# Patient Record
Sex: FEMALE | Race: WHITE | NOT HISPANIC OR LATINO | Employment: FULL TIME | URBAN - NONMETROPOLITAN AREA
[De-identification: names, ages, dates, MRNs, and addresses within clinical notes are randomized per-mention and may not be internally consistent; named-entity substitution may affect disease eponyms.]

---

## 2018-03-28 ENCOUNTER — OFFICE VISIT (OUTPATIENT)
Dept: URGENT CARE | Facility: PHYSICIAN GROUP | Age: 33
End: 2018-03-28
Payer: MEDICAID

## 2018-03-28 VITALS
HEART RATE: 80 BPM | HEIGHT: 64 IN | BODY MASS INDEX: 25.61 KG/M2 | SYSTOLIC BLOOD PRESSURE: 114 MMHG | WEIGHT: 150 LBS | TEMPERATURE: 97.8 F | RESPIRATION RATE: 16 BRPM | OXYGEN SATURATION: 97 % | DIASTOLIC BLOOD PRESSURE: 82 MMHG

## 2018-03-28 DIAGNOSIS — J31.0 OTHER RHINITIS, UNSPECIFIED CHRONICITY: ICD-10-CM

## 2018-03-28 DIAGNOSIS — H92.02 LEFT EAR PAIN: ICD-10-CM

## 2018-03-28 PROCEDURE — 99203 OFFICE O/P NEW LOW 30 MIN: CPT | Performed by: PHYSICIAN ASSISTANT

## 2018-03-28 RX ORDER — FLUTICASONE PROPIONATE 50 MCG
1 SPRAY, SUSPENSION (ML) NASAL 2 TIMES DAILY
Qty: 1 BOTTLE | Refills: 0 | Status: SHIPPED | OUTPATIENT
Start: 2018-03-28 | End: 2018-09-16 | Stop reason: SDUPTHER

## 2018-03-28 NOTE — PROGRESS NOTES
Chief Complaint   Patient presents with   • Otalgia       HISTORY OF PRESENT ILLNESS: Patient is a 32 y.o. female who presents today for the following:    Left ear pain x yesterday  +chronic nasal congestion  Denies fever, cough, ST, ear drainage, hearing difficulty  OTC meds tried: none     Patient Active Problem List    Diagnosis Date Noted   • Encounter for supervision of other normal pregnancy 2010   • Rh Negative Status During Pregnancy requiring RHO-sami at 28 weeks 2010   • History of  wants  2010       Allergies:Percocet [oxycodone-acetaminophen]    Current Outpatient Prescriptions Ordered in [facility]   Medication Sig Dispense Refill   • fluticasone (FLONASE) 50 MCG/ACT nasal spray Spray 1 Spray in nose 2 times a day. 1 Bottle 0   • acyclovir (ZOVIRAX) 400 MG tablet Take 1 Tab by mouth 2 times a day. 60 Tab 1   • Prenatal Vit w/ Fe Bisg-FA 25-1 MG TABS Take 1 Tab by mouth every day.     • Iron 15 MG TABS Take 1 Tab by mouth every day.       No current [facility]-ordered facility-administered medications on file.        Past Medical History:   Diagnosis Date   • 599.0     W#/ Pg   • Rh Negative Status During Pregnancy requiring RHO-sami at 28 weeks 3/26/2010       Social History   Substance Use Topics   • Smoking status: Current Every Day Smoker     Packs/day: 0.50     Years: 4.00     Types: Cigarettes   • Smokeless tobacco: Never Used      Comment: 3-4 PER DAY no desire to quit   • Alcohol use No       Family Status   Relation Status   • Maternal Aunt Alive   • Paternal Grandmother Alive   • Paternal Grandfather Alive   • Mother Alive   • Father Alive   • Brother Alive   • Maternal Grandmother     alZheimers   • Maternal Grandfather Alive   • Brother Alive     Family History   Problem Relation Age of Onset   • Cancer Maternal Aunt      BREAST   • Heart Disease Paternal Grandmother    • Diabetes Paternal Grandmother      insulin   • Cancer Maternal Grandfather      Throat       ROS:  "   Review of Systems   Constitutional: Negative for fever, chills, weight loss and malaise/fatigue.   HENT: Negative for  nosebleeds,  sore throat and neck pain.    Eyes: Negative for blurred vision.   Respiratory: Negative for cough, sputum production, shortness of breath and wheezing.    Cardiovascular: Negative for chest pain, palpitations, orthopnea and leg swelling.   Gastrointestinal: Negative for heartburn, nausea, vomiting and abdominal pain.   Genitourinary: Negative for dysuria, urgency and frequency.       Exam:  Blood pressure 114/82, pulse 80, temperature 36.6 °C (97.8 °F), resp. rate 16, height 1.626 m (5' 4\"), weight 68 kg (150 lb), SpO2 97 %.  General: Well developed, well nourished. No distress.  HEENT: Conjunctiva clear, lids without ptosis, PERRL/EOMI. Ears normal shape and contour, canals are clear bilaterally, tympanic membranes are benign but retracted bilaterally. Nasal mucosa is edematous bilaterally. Oropharynx is without erythema, edema or exudates. Moist mucous membranes.  Pulmonary: Clear to ausculation and percussion.  Normal effort. No rales, ronchi, or wheezing.   Cardiovascular: Regular rate and rhythm without murmur. No edema.   Neurologic: Grossly nonfocal.  Lymph: No cervical lymphadenopathy noted.  Skin: Warm, dry, good turgor. No rashes in visible areas.   Psych: Normal mood. Alert and oriented x3. Judgment and insight is normal.    Assessment/Plan:  Take all medications as instructed. Discussed appropriate over-the-counter symptomatic medication. Follow-up worsening or persistent symptoms.  1. Other rhinitis, unspecified chronicity  fluticasone (FLONASE) 50 MCG/ACT nasal spray   2. Left ear pain         "

## 2018-04-23 ENCOUNTER — OFFICE VISIT (OUTPATIENT)
Dept: URGENT CARE | Facility: PHYSICIAN GROUP | Age: 33
End: 2018-04-23
Payer: MEDICAID

## 2018-04-23 VITALS
TEMPERATURE: 97.9 F | SYSTOLIC BLOOD PRESSURE: 128 MMHG | BODY MASS INDEX: 26.16 KG/M2 | OXYGEN SATURATION: 100 % | WEIGHT: 157 LBS | DIASTOLIC BLOOD PRESSURE: 76 MMHG | HEIGHT: 65 IN | RESPIRATION RATE: 16 BRPM | HEART RATE: 88 BPM

## 2018-04-23 DIAGNOSIS — R10.9 BELLY PAIN: ICD-10-CM

## 2018-04-23 LAB
APPEARANCE UR: NORMAL
BILIRUB UR STRIP-MCNC: NEGATIVE MG/DL
COLOR UR AUTO: YELLOW
GLUCOSE UR STRIP.AUTO-MCNC: NEGATIVE MG/DL
INT CON NEG: NORMAL
INT CON POS: NORMAL
KETONES UR STRIP.AUTO-MCNC: NEGATIVE MG/DL
LEUKOCYTE ESTERASE UR QL STRIP.AUTO: NORMAL
NITRITE UR QL STRIP.AUTO: NEGATIVE
PH UR STRIP.AUTO: 7.5 [PH] (ref 5–8)
POC URINE PREGNANCY TEST: NEGATIVE
PROT UR QL STRIP: NEGATIVE MG/DL
RBC UR QL AUTO: NORMAL
SP GR UR STRIP.AUTO: 1
UROBILINOGEN UR STRIP-MCNC: NEGATIVE MG/DL

## 2018-04-23 PROCEDURE — 81002 URINALYSIS NONAUTO W/O SCOPE: CPT | Performed by: FAMILY MEDICINE

## 2018-04-23 PROCEDURE — 99214 OFFICE O/P EST MOD 30 MIN: CPT | Mod: 25 | Performed by: FAMILY MEDICINE

## 2018-04-23 PROCEDURE — 81025 URINE PREGNANCY TEST: CPT | Performed by: FAMILY MEDICINE

## 2018-04-23 RX ORDER — TRAMADOL HYDROCHLORIDE 50 MG/1
50 TABLET ORAL EVERY 8 HOURS PRN
Qty: 30 TAB | Refills: 0 | Status: SHIPPED | OUTPATIENT
Start: 2018-04-23 | End: 2018-05-03

## 2018-04-23 RX ORDER — SULFAMETHOXAZOLE AND TRIMETHOPRIM 800; 160 MG/1; MG/1
1 TABLET ORAL EVERY 12 HOURS
Qty: 10 TAB | Refills: 0 | Status: SHIPPED | OUTPATIENT
Start: 2018-04-23 | End: 2018-04-28

## 2018-04-23 ASSESSMENT — ENCOUNTER SYMPTOMS
FOCAL WEAKNESS: 0
FEVER: 0
BLOOD IN STOOL: 0
COUGH: 0
VOMITING: 0
SPUTUM PRODUCTION: 0
HEMOPTYSIS: 0
DIZZINESS: 0
ABDOMINAL PAIN: 1
CHILLS: 0
NAUSEA: 0

## 2018-04-23 NOTE — PROGRESS NOTES
"Subjective:      Jasmyne Virk is a 32 y.o. female who presents with Abdominal Pain    Chief Complaint   Patient presents with   • Abdominal Pain        - This is a very pleasant 32 y.o. female with complaints of waking up w/ RUQ pain today. Has Hx gallstones and says they act up time to time, no fever/vomiting. She had cereal for breakfast w/o problems. No bloody stools.           ALLERGIES:  Percocet [oxycodone-acetaminophen]     PMH:  Past Medical History:   Diagnosis Date   • 599.0     W#/ Pg   • Rh Negative Status During Pregnancy requiring RHO-sami at 28 weeks 3/26/2010        MEDS:    Current Outpatient Prescriptions:   •  tramadol (ULTRAM) 50 MG Tab, Take 1 Tab by mouth every 8 hours as needed for up to 10 days., Disp: 30 Tab, Rfl: 0  •  sulfamethoxazole-trimethoprim (BACTRIM DS) 800-160 MG tablet, Take 1 Tab by mouth every 12 hours for 5 days., Disp: 10 Tab, Rfl: 0  •  fluticasone (FLONASE) 50 MCG/ACT nasal spray, Spray 1 Spray in nose 2 times a day., Disp: 1 Bottle, Rfl: 0  •  acyclovir (ZOVIRAX) 400 MG tablet, Take 1 Tab by mouth 2 times a day., Disp: 60 Tab, Rfl: 1  •  Prenatal Vit w/ Fe Bisg-FA 25-1 MG TABS, Take 1 Tab by mouth every day., Disp: , Rfl:   •  Iron 15 MG TABS, Take 1 Tab by mouth every day., Disp: , Rfl:     ** I have documented what I find to be significant in regards to past medical, social, family and surgical history  in my HPI or under PMH/PSH/FH review section, otherwise it is contributory **           HPI    Review of Systems   Constitutional: Negative for chills and fever.   Respiratory: Negative for cough, hemoptysis and sputum production.    Gastrointestinal: Positive for abdominal pain. Negative for blood in stool, nausea and vomiting.   Genitourinary: Negative for dysuria and frequency.   Neurological: Negative for dizziness and focal weakness.          Objective:     /76   Pulse 88   Temp 36.6 °C (97.9 °F)   Resp 16   Ht 1.651 m (5' 5\")   Wt 71.2 kg (157 lb)   SpO2 " 100%   BMI 26.13 kg/m²      Physical Exam   Constitutional: She appears well-developed. No distress.   HENT:   Head: Normocephalic and atraumatic.   Mouth/Throat: Oropharynx is clear and moist.   Eyes: Conjunctivae are normal.   Neck: Neck supple.   Cardiovascular: Regular rhythm.    No murmur heard.  Pulmonary/Chest: Effort normal. No respiratory distress.   Abdominal: Soft. Bowel sounds are normal. She exhibits no distension. There is tenderness ( mild RUQ w/ deep palp). There is no rebound and no guarding.   Neurological: She is alert. She exhibits normal muscle tone.   Skin: Skin is warm and dry.   Psychiatric: She has a normal mood and affect. Judgment normal.   Nursing note and vitals reviewed.              Assessment/Plan:         1. Belly pain  POCT Urinalysis    POCT Pregnancy    tramadol (ULTRAM) 50 MG Tab    sulfamethoxazole-trimethoprim (BACTRIM DS) 800-160 MG tablet         * symptomatic gallstones. CLD today, start meds, if not improving over next 3-4 hrs or getting worse or NVFC go to ER  * UA +, suspect nonspecific, will cover w/ bactrim anyway     Dx & d/c instructions discussed w/ patient and/or family members. Follow up w/ Prvt Dr/GenSurg, sooner if needed,  ER if worse and UC/PCP unavailable.        Possible side effects (i.e. Rash, GI upset/constipation, sedation, elevation of BP or sugars) of any medications given discussed.

## 2018-05-15 ENCOUNTER — OFFICE VISIT (OUTPATIENT)
Dept: URGENT CARE | Facility: PHYSICIAN GROUP | Age: 33
End: 2018-05-15
Payer: MEDICAID

## 2018-05-15 VITALS
BODY MASS INDEX: 26.66 KG/M2 | HEIGHT: 65 IN | RESPIRATION RATE: 16 BRPM | DIASTOLIC BLOOD PRESSURE: 72 MMHG | OXYGEN SATURATION: 98 % | WEIGHT: 160 LBS | HEART RATE: 88 BPM | SYSTOLIC BLOOD PRESSURE: 114 MMHG | TEMPERATURE: 98.1 F

## 2018-05-15 DIAGNOSIS — M79.604 RIGHT LEG PAIN: ICD-10-CM

## 2018-05-15 PROCEDURE — 99214 OFFICE O/P EST MOD 30 MIN: CPT | Performed by: PHYSICIAN ASSISTANT

## 2018-05-15 RX ORDER — GABAPENTIN 100 MG/1
100 CAPSULE ORAL 3 TIMES DAILY PRN
Qty: 30 CAP | Refills: 0 | Status: SHIPPED | OUTPATIENT
Start: 2018-05-15 | End: 2019-11-18

## 2018-05-16 NOTE — PROGRESS NOTES
Chief Complaint   Patient presents with   • Leg Pain       HISTORY OF PRESENT ILLNESS: Patient is a 32 y.o. female who presents today for the following:    Right leg pain x 1 week, constant  Hard time sleeping, leg seems to kick on it's own  Chronic low back pain  Has RLE pain in the past - only intermittently  Denies recent injury  OTC meds tried: none  No exogenous hormones, recent long trips, recent surgery, h/o DVT/PE  + smokes 1/2ppd,      Patient Active Problem List    Diagnosis Date Noted   • Encounter for supervision of other normal pregnancy 2010   • Rh Negative Status During Pregnancy requiring RHO-sami at 28 weeks 2010   • History of  wants  2010       Allergies:Percocet [oxycodone-acetaminophen]    Current Outpatient Prescriptions Ordered in [facility]   Medication Sig Dispense Refill   • gabapentin (NEURONTIN) 100 MG Cap Take 1 Cap by mouth 3 times a day as needed (pain). Start taking at bedtime only; increase as tolerated 30 Cap 0   • fluticasone (FLONASE) 50 MCG/ACT nasal spray Spray 1 Spray in nose 2 times a day. 1 Bottle 0   • acyclovir (ZOVIRAX) 400 MG tablet Take 1 Tab by mouth 2 times a day. 60 Tab 1   • Prenatal Vit w/ Fe Bisg-FA 25-1 MG TABS Take 1 Tab by mouth every day.     • Iron 15 MG TABS Take 1 Tab by mouth every day.       No current [facility]-ordered facility-administered medications on file.        Past Medical History:   Diagnosis Date   • 599.0     W#/ Pg   • Rh Negative Status During Pregnancy requiring RHO-sami at 28 weeks 3/26/2010       Social History   Substance Use Topics   • Smoking status: Current Every Day Smoker     Packs/day: 0.50     Years: 4.00     Types: Cigarettes   • Smokeless tobacco: Never Used      Comment: 3-4 PER DAY no desire to quit   • Alcohol use No       Family Status   Relation Status   • Maternal Aunt Alive   • Paternal Grandmother Alive   • Paternal Grandfather Alive   • Mother Alive   • Father Alive   • Brother Alive   • Maternal  "Grandmother     alZheimers   • Maternal Grandfather Alive   • Brother Alive     Family History   Problem Relation Age of Onset   • Cancer Maternal Aunt      BREAST   • Heart Disease Paternal Grandmother    • Diabetes Paternal Grandmother      insulin   • Cancer Maternal Grandfather      Throat       Review of Systems:    Constitutional ROS: No unexpected change in weight, No weakness, No fatigue  Eye ROS: No recent significant change in vision, No eye pain, redness, discharge  Ear ROS: No drainage, No tinnitus or vertigo, No recent change in hearing  Mouth/Throat ROS: No teeth or gum problems, No bleeding gums, No tongue complaints  Neck ROS: No swollen glands, No significant pain in neck  Pulmonary ROS: No chronic cough, sputum, or hemoptysis, No dyspnea on exertion, No wheezing  Cardiovascular ROS: No diaphoresis, No edema, No palpitations  Gastrointestinal ROS: No change in bowel habits, No significant change in appetite, No nausea, vomiting, diarrhea, or constipation  Hematologic/Lymphatic ROS: No chills, No night sweats, No weight loss  Skin/Integumentary ROS: No edema, No evidence of rash, No itching      Exam:  Blood pressure 114/72, pulse 88, temperature 36.7 °C (98.1 °F), resp. rate 16, height 1.651 m (5' 5\"), weight 72.6 kg (160 lb), SpO2 98 %.  General: Well developed, well nourished. No distress.  HEENT: Head is grossly normal.  Pulmonary: Unlabored respiratory effort.    Cardiovascular: Pedal pulses are strong and equal bilaterally.  Extremities: No motor or sensory deficit noted. No obvious edema noted in the right lower leg. No rashes, bruising, ecchymosis, or petechiae is noted. Mild tenderness is noted on the right calf without associated swelling. Pain is not worse with passive dorsiflexion. Otherwise no localized tenderness is noted on the right lower leg.  Psych: Normal mood. Alert and oriented x3. Judgment and insight is normal.    Assessment/Plan:  Discussed differential diagnosis with " the patient including to muscle strain versus nerve pain versus DVT. The patient understands we are unable to rule out DVT in this location. Vital signs are within normal limits. Patient has no other significant risk factors for DVT at this time. Will start gabapentin but discussed ER precautions for any worsening leg pain or the development of swelling.  1. Right leg pain  gabapentin (NEURONTIN) 100 MG Cap

## 2018-09-16 ENCOUNTER — OFFICE VISIT (OUTPATIENT)
Dept: URGENT CARE | Facility: PHYSICIAN GROUP | Age: 33
End: 2018-09-16
Payer: MEDICAID

## 2018-09-16 VITALS
WEIGHT: 161 LBS | HEART RATE: 68 BPM | OXYGEN SATURATION: 98 % | SYSTOLIC BLOOD PRESSURE: 116 MMHG | HEIGHT: 65 IN | RESPIRATION RATE: 16 BRPM | BODY MASS INDEX: 26.82 KG/M2 | TEMPERATURE: 98.8 F | DIASTOLIC BLOOD PRESSURE: 78 MMHG

## 2018-09-16 DIAGNOSIS — J00 ACUTE RHINITIS: ICD-10-CM

## 2018-09-16 DIAGNOSIS — H65.93 FLUID LEVEL BEHIND TYMPANIC MEMBRANE OF BOTH EARS: ICD-10-CM

## 2018-09-16 PROCEDURE — 99214 OFFICE O/P EST MOD 30 MIN: CPT | Performed by: PHYSICIAN ASSISTANT

## 2018-09-16 RX ORDER — FLUTICASONE PROPIONATE 50 MCG
1 SPRAY, SUSPENSION (ML) NASAL 2 TIMES DAILY
Qty: 1 BOTTLE | Refills: 5 | Status: SHIPPED | OUTPATIENT
Start: 2018-09-16

## 2018-09-16 NOTE — PROGRESS NOTES
Chief Complaint   Patient presents with   • Otalgia       HISTORY OF PRESENT ILLNESS: Patient is a 33 y.o. female who presents today for the following:    Left ear pain x yesterday  Denies drainage; sounds muffled  + mild cough, mild ST  Denies fever, nasal congestion  OTC meds today: none     Patient Active Problem List    Diagnosis Date Noted   • Encounter for supervision of other normal pregnancy 2010   • Rh Negative Status During Pregnancy requiring RHO-sami at 28 weeks 2010   • History of  wants  2010       Allergies:Percocet [oxycodone-acetaminophen]    Current Outpatient Prescriptions Ordered in [facility]   Medication Sig Dispense Refill   • fluticasone (FLONASE) 50 MCG/ACT nasal spray Spray 1 Spray in nose 2 times a day. 1 Bottle 5   • gabapentin (NEURONTIN) 100 MG Cap Take 1 Cap by mouth 3 times a day as needed (pain). Start taking at bedtime only; increase as tolerated 30 Cap 0   • acyclovir (ZOVIRAX) 400 MG tablet Take 1 Tab by mouth 2 times a day. 60 Tab 1   • Prenatal Vit w/ Fe Bisg-FA 25-1 MG TABS Take 1 Tab by mouth every day.     • Iron 15 MG TABS Take 1 Tab by mouth every day.       No current [facility]-ordered facility-administered medications on file.        Past Medical History:   Diagnosis Date   • 599.0     W#/ Pg   • Rh Negative Status During Pregnancy requiring RHO-sami at 28 weeks 3/26/2010       Social History   Substance Use Topics   • Smoking status: Current Every Day Smoker     Packs/day: 0.50     Years: 4.00     Types: Cigarettes   • Smokeless tobacco: Never Used      Comment: 3-4 PER DAY no desire to quit   • Alcohol use No       Family Status   Relation Status   • MAunt Alive   • PGMo Alive   • PGFa Alive   • Mo Alive   • Fa Alive   • Bro Alive   • MGMo         alZheimers   • MGFa Alive   • Bro Alive     Family History   Problem Relation Age of Onset   • Cancer Maternal Aunt         BREAST   • Heart Disease Paternal Grandmother    • Diabetes Paternal  "Grandmother         insulin   • Cancer Maternal Grandfather         Throat       Review of Systems:   Constitutional ROS: No unexpected change in weight, No weakness, No fatigue  Eye ROS: No recent significant change in vision, No eye pain, redness, discharge  Ear ROS: No drainage, No tinnitus or vertigo, No recent change in hearing  Mouth/Throat ROS: No teeth or gum problems, No bleeding gums, No tongue complaints  Neck ROS: No swollen glands, No significant pain in neck  Pulmonary ROS: No chronic cough, sputum, or hemoptysis, No dyspnea on exertion, No wheezing  Cardiovascular ROS: No diaphoresis, No edema, No palpitations  Gastrointestinal ROS: No change in bowel habits, No significant change in appetite, No nausea, vomiting, diarrhea, or constipation  Musculoskeletal/Extremities ROS: No peripheral edema, No pain, redness or swelling on the joints  Hematologic/Lymphatic ROS: No chills, No night sweats, No weight loss  Skin/Integumentary ROS: No edema, No evidence of rash, No itching      Exam:  Blood pressure 116/78, pulse 68, temperature 37.1 °C (98.8 °F), resp. rate 16, height 1.651 m (5' 5\"), weight 73 kg (161 lb), SpO2 98 %.  General: Well developed, well nourished. No distress.  Eye: PERRL/EOMI; conjunctivae clear, lids normal.  ENMT: Lips without lesions, MMM. Oropharynx is clear. Bilateral TMs are within normal limits but with clear fluid posteriorly bilaterally.  Nasal mucosa is edematous bilaterally.  Pulmonary: Unlabored respiratory effort. Lungs clear to auscultation, no wheezes, no rhonchi.  Cardiovascular: Regular rate and rhythm without murmur.    Neurologic: Grossly nonfocal. No facial asymmetry noted.  Lymph: No cervical lymphadenopathy noted.  Skin: Warm, dry, good turgor. No rashes in visible areas.   Psych: Normal mood. Alert and oriented x3. Judgment and insight is normal.    Assessment/Plan:  Discussed likely due to seasonal allergies. Discussed appropriate over-the-counter symptomatic " medication, and when to return to clinic.  Use all medication as directed.  Follow-up for worsening or persistent symptoms.  1. Fluid level behind tympanic membrane of both ears  fluticasone (FLONASE) 50 MCG/ACT nasal spray   2. Acute rhinitis  fluticasone (FLONASE) 50 MCG/ACT nasal spray

## 2018-10-28 ENCOUNTER — OFFICE VISIT (OUTPATIENT)
Dept: URGENT CARE | Facility: PHYSICIAN GROUP | Age: 33
End: 2018-10-28
Payer: MEDICAID

## 2018-10-28 VITALS
WEIGHT: 161 LBS | OXYGEN SATURATION: 95 % | HEIGHT: 65 IN | SYSTOLIC BLOOD PRESSURE: 130 MMHG | BODY MASS INDEX: 26.82 KG/M2 | TEMPERATURE: 97.8 F | DIASTOLIC BLOOD PRESSURE: 80 MMHG | RESPIRATION RATE: 14 BRPM | HEART RATE: 90 BPM

## 2018-10-28 DIAGNOSIS — R22.0 TONGUE SWELLING: ICD-10-CM

## 2018-10-28 PROCEDURE — 99214 OFFICE O/P EST MOD 30 MIN: CPT | Performed by: PHYSICIAN ASSISTANT

## 2018-10-28 RX ORDER — AMOXICILLIN 500 MG/1
500 CAPSULE ORAL 2 TIMES DAILY
Qty: 20 CAP | Refills: 0 | Status: SHIPPED | OUTPATIENT
Start: 2018-10-28 | End: 2018-11-07

## 2018-10-28 NOTE — PROGRESS NOTES
Chief Complaint   Patient presents with   • URI     Cold Sx       HISTORY OF PRESENT ILLNESS: Patient is a 33 y.o. female who presents today for the following:    Tongue swelling/pain x last night  Tender lymph nodes  S/p anna 3 weeks ago; took her tongue ring out that she has had since she was 16 years old.  Put it back in about 10 days ago.  + nasal congestion, cough  Denies fever, SOB  OTC meds: nothing today     Patient Active Problem List    Diagnosis Date Noted   • Encounter for supervision of other normal pregnancy 2010   • Rh Negative Status During Pregnancy requiring RHO-sami at 28 weeks 2010   • History of  wants  2010       Allergies:Percocet [oxycodone-acetaminophen]    Current Outpatient Prescriptions Ordered in [facility]   Medication Sig Dispense Refill   • amoxicillin (AMOXIL) 500 MG Cap Take 1 Cap by mouth 2 times a day for 10 days. 20 Cap 0   • fluticasone (FLONASE) 50 MCG/ACT nasal spray Spray 1 Spray in nose 2 times a day. 1 Bottle 5   • gabapentin (NEURONTIN) 100 MG Cap Take 1 Cap by mouth 3 times a day as needed (pain). Start taking at bedtime only; increase as tolerated 30 Cap 0   • acyclovir (ZOVIRAX) 400 MG tablet Take 1 Tab by mouth 2 times a day. 60 Tab 1   • Prenatal Vit w/ Fe Bisg-FA 25-1 MG TABS Take 1 Tab by mouth every day.     • Iron 15 MG TABS Take 1 Tab by mouth every day.       No current [facility]-ordered facility-administered medications on file.        Past Medical History:   Diagnosis Date   • 599.0     W#/ Pg   • Rh Negative Status During Pregnancy requiring RHO-sami at 28 weeks 3/26/2010       Social History   Substance Use Topics   • Smoking status: Current Every Day Smoker     Packs/day: 0.50     Years: 4.00     Types: Cigarettes   • Smokeless tobacco: Never Used      Comment: 3-4 PER DAY no desire to quit   • Alcohol use No       Family Status   Relation Status   • MAunt Alive   • PGMo Alive   • PGFa Alive   • Mo Alive   • Fa Alive   • Bro Alive  "  • MGMo         alZheimers   • MGFa Alive   • Bro Alive     Family History   Problem Relation Age of Onset   • Cancer Maternal Aunt         BREAST   • Heart Disease Paternal Grandmother    • Diabetes Paternal Grandmother         insulin   • Cancer Maternal Grandfather         Throat       Review of Systems:   Constitutional ROS: No unexpected change in weight, No weakness, No fatigue  Eye ROS: No recent significant change in vision, No eye pain, redness, discharge  Ear ROS: No drainage, No tinnitus or vertigo, No recent change in hearing  Mouth/Throat ROS: No teeth or gum problems, No bleeding gums, No tongue complaints  Neck ROS: No swollen glands, No significant pain in neck  Pulmonary ROS: No chronic cough, sputum, or hemoptysis, No dyspnea on exertion, No wheezing  Cardiovascular ROS: No diaphoresis, No edema, No palpitations  Gastrointestinal ROS: No change in bowel habits, No significant change in appetite, No nausea, vomiting, diarrhea, or constipation  Musculoskeletal/Extremities ROS: No peripheral edema, No pain, redness or swelling on the joints  Hematologic/Lymphatic ROS: No chills, No night sweats, No weight loss  Skin/Integumentary ROS: No edema, No evidence of rash, No itching      Exam:  Blood pressure 130/80, pulse 90, temperature 36.6 °C (97.8 °F), temperature source Temporal, resp. rate 14, height 1.651 m (5' 5\"), weight 73 kg (161 lb), SpO2 95 %.  General: Well developed, well nourished. No distress.  HEENT: Diffusely poor dentition.  No obvious edema noted on the tongue however the tongue is not visible underneath the tongue.  It appears that it has been pushed into the meat of the tongue.  No erythema or drainage is noted of the tongue.  Pulmonary: Unlabored respiratory effort.    Neurologic: Grossly nonfocal. No facial asymmetry noted.  Lymph: Tender anterior cervical lymphadenopathy noted bilaterally.  Skin: Warm, dry, good turgor. No rashes in visible areas.   Psych: Normal mood. " Alert and oriented x3. Judgment and insight is normal.    Assessment/Plan:  Use all medication as directed.  Follow-up for worsening or persistent symptoms.  1. Tongue swelling  amoxicillin (AMOXIL) 500 MG Cap

## 2019-08-27 ENCOUNTER — NON-PROVIDER VISIT (OUTPATIENT)
Dept: URGENT CARE | Facility: PHYSICIAN GROUP | Age: 34
End: 2019-08-27

## 2019-08-27 DIAGNOSIS — Z02.1 PRE-EMPLOYMENT DRUG SCREENING: ICD-10-CM

## 2019-08-27 LAB
AMP AMPHETAMINE: NORMAL
COC COCAINE: NORMAL
INT CON NEG: NORMAL
INT CON POS: NORMAL
MET METHAMPHETAMINES: NORMAL
OPI OPIATES: NORMAL
PCP PHENCYCLIDINE: NORMAL
POC DRUG COMMENT 753798-OCCUPATIONAL HEALTH: NORMAL
THC: NORMAL

## 2019-08-27 PROCEDURE — 80305 DRUG TEST PRSMV DIR OPT OBS: CPT | Performed by: PHYSICIAN ASSISTANT

## 2019-11-18 ENCOUNTER — OFFICE VISIT (OUTPATIENT)
Dept: URGENT CARE | Facility: PHYSICIAN GROUP | Age: 34
End: 2019-11-18
Payer: MEDICAID

## 2019-11-18 VITALS
WEIGHT: 133.8 LBS | OXYGEN SATURATION: 98 % | SYSTOLIC BLOOD PRESSURE: 110 MMHG | HEART RATE: 79 BPM | BODY MASS INDEX: 22.27 KG/M2 | DIASTOLIC BLOOD PRESSURE: 70 MMHG | TEMPERATURE: 98.1 F | RESPIRATION RATE: 16 BRPM

## 2019-11-18 DIAGNOSIS — J06.9 URI WITH COUGH AND CONGESTION: ICD-10-CM

## 2019-11-18 PROCEDURE — 99214 OFFICE O/P EST MOD 30 MIN: CPT | Performed by: PHYSICIAN ASSISTANT

## 2019-11-18 RX ORDER — AZITHROMYCIN 250 MG/1
TABLET, FILM COATED ORAL
Qty: 6 TAB | Refills: 0 | Status: SHIPPED | OUTPATIENT
Start: 2019-11-18 | End: 2019-12-21

## 2019-11-18 NOTE — LETTER
November 18, 2019         Patient: Jasmyne Virk   YOB: 1985   Date of Visit: 11/18/2019           To Whom it May Concern:    Jasmyne Virk was seen in my clinic on 11/18/2019. She may return to work on 11/19/19.    If you have any questions or concerns, please don't hesitate to call.        Sincerely,           Phyllis Camejo P.A.-C.  Electronically Signed

## 2019-11-18 NOTE — PROGRESS NOTES
Chief Complaint   Patient presents with   • Cough     x 4days       HISTORY OF PRESENT ILLNESS: Patient is a 34 y.o. female who presents today for the following:    Patient comes in for evaluation of a cough that started 3 days ago.  She complains of chills and body aches for the last 2 days but has not had anything today.  She denies fever, shortness of breath, and vomiting with coughing.  She has not taken any over-the-counter medication.  She works in [place] is concerned about with a cough.    Patient Active Problem List    Diagnosis Date Noted   • Encounter for supervision of other normal pregnancy 2010   • Rh Negative Status During Pregnancy requiring RHO-sami at 28 weeks 2010   • History of  wants  2010       Allergies:Percocet [oxycodone-acetaminophen]    Current Outpatient Medications Ordered in Epic   Medication Sig Dispense Refill   • azithromycin (ZITHROMAX) 250 MG Tab Use as package directs. Fill if symptoms worsen at any point OR if they fail to improve over the next 7-10 days. 6 Tab 0   • fluticasone (FLONASE) 50 MCG/ACT nasal spray Spray 1 Spray in nose 2 times a day. 1 Bottle 5   • Prenatal Vit w/ Fe Bisg-FA 25-1 MG TABS Take 1 Tab by mouth every day.     • Iron 15 MG TABS Take 1 Tab by mouth every day.       No current [facility]-ordered facility-administered medications on file.        Past Medical History:   Diagnosis Date   • 599.0     W#/ Pg   • Rh Negative Status During Pregnancy requiring RHO-sami at 28 weeks 3/26/2010       Social History     Tobacco Use   • Smoking status: Current Every Day Smoker     Packs/day: 0.50     Years: 4.00     Pack years: 2.00     Types: Cigarettes   • Smokeless tobacco: Never Used   • Tobacco comment: 3-4 PER DAY no desire to quit   Substance Use Topics   • Alcohol use: No   • Drug use: No       Family Status   Relation Name Status   • MAunt  Alive   • PGMo  Alive   • PGFa  Alive   • Mo  Alive   • Fa  Alive   • Bro  Alive   • MGMo           alZheimers   • MGFa  Alive   • Bro  Alive     Family History   Problem Relation Age of Onset   • Cancer Maternal Aunt         BREAST   • Heart Disease Paternal Grandmother    • Diabetes Paternal Grandmother         insulin   • Cancer Maternal Grandfather         Throat       Review of Systems:   Constitutional ROS: No unexpected change in weight, No weakness, No fatigue  Eye ROS: No recent significant change in vision, No eye pain, redness, discharge  Ear ROS: No drainage, No tinnitus or vertigo, No recent change in hearing  Mouth/Throat ROS: No teeth or gum problems, No bleeding gums, No tongue complaints  Neck ROS: No swollen glands, No significant pain in neck  Pulmonary ROS: No chronic cough, sputum, or hemoptysis, No dyspnea on exertion, No wheezing  Cardiovascular ROS: No diaphoresis, No edema, No palpitations  Gastrointestinal ROS: No change in bowel habits, No significant change in appetite, No nausea, vomiting, diarrhea, or constipation  Musculoskeletal/Extremities ROS: No peripheral edema, No pain, redness or swelling on the joints  Hematologic/Lymphatic ROS: Positive for body aches and chills, resolved.  Skin/Integumentary ROS: No edema, No evidence of rash, No itching      Exam:  /70   Pulse 79   Temp 36.7 °C (98.1 °F) (Temporal)   Resp 16   Wt 60.7 kg (133 lb 12.8 oz)   SpO2 98%   General: Well developed, well nourished. No distress.  Eye: PERRL/EOMI; conjunctivae clear, lids normal.  ENMT: Lips without lesions, MMM. Oropharynx is clear. Bilateral TMs are within normal limits.  Pulmonary: Unlabored respiratory effort. Lungs clear to auscultation, no wheezes, no rhonchi.  Cardiovascular: Regular rate and rhythm without murmur.    Neurologic: Grossly nonfocal. No facial asymmetry noted.  Lymph: No cervical lymphadenopathy noted.  Skin: Warm, dry, good turgor. No rashes in visible areas.   Psych: Normal mood. Alert and oriented x3. Judgment and insight is  normal.    Assessment/Plan:  Discussed likely viral etiology, low suspicion for open cough at this time but provided patient with contingent antibiotics should symptoms change.  Follow-up for worsening or persistent symptoms.  1. URI with cough and congestion  azithromycin (ZITHROMAX) 250 MG Tab

## 2019-12-21 ENCOUNTER — OCCUPATIONAL MEDICINE (OUTPATIENT)
Dept: URGENT CARE | Facility: PHYSICIAN GROUP | Age: 34
End: 2019-12-21
Payer: MEDICAID

## 2019-12-21 VITALS
HEART RATE: 96 BPM | RESPIRATION RATE: 16 BRPM | WEIGHT: 133 LBS | HEIGHT: 65 IN | OXYGEN SATURATION: 100 % | BODY MASS INDEX: 22.16 KG/M2 | SYSTOLIC BLOOD PRESSURE: 100 MMHG | TEMPERATURE: 97.8 F | DIASTOLIC BLOOD PRESSURE: 72 MMHG

## 2019-12-21 DIAGNOSIS — S66.911A STRAIN OF RIGHT WRIST, INITIAL ENCOUNTER: ICD-10-CM

## 2019-12-21 DIAGNOSIS — S56.911A STRAIN OF RIGHT ELBOW AND FOREARM, INITIAL ENCOUNTER: ICD-10-CM

## 2019-12-21 DIAGNOSIS — S66.911A STRAIN OF RIGHT HAND, INITIAL ENCOUNTER: ICD-10-CM

## 2019-12-21 LAB
AMP AMPHETAMINE: NORMAL
BAR BARBITURATES: NORMAL
BREATH ALCOHOL COMMENT: NORMAL
BZO BENZODIAZEPINES: NORMAL
COC COCAINE: NORMAL
INT CON NEG: NORMAL
INT CON POS: NORMAL
MDMA ECSTASY: NORMAL
MET METHAMPHETAMINES: NORMAL
MTD METHADONE: NORMAL
OPI OPIATES: NORMAL
OXY OXYCODONE: NORMAL
PCP PHENCYCLIDINE: NORMAL
POC BREATHALIZER: 0 PERCENT (ref 0–0.01)
POC URINE DRUG SCREEN OCDRS: NEGATIVE
THC: NORMAL

## 2019-12-21 PROCEDURE — 80305 DRUG TEST PRSMV DIR OPT OBS: CPT | Performed by: FAMILY MEDICINE

## 2019-12-21 PROCEDURE — 82075 ASSAY OF BREATH ETHANOL: CPT | Performed by: FAMILY MEDICINE

## 2019-12-21 PROCEDURE — 99214 OFFICE O/P EST MOD 30 MIN: CPT | Performed by: FAMILY MEDICINE

## 2019-12-21 RX ORDER — PREDNISONE 20 MG/1
TABLET ORAL
Qty: 15 TAB | Refills: 0 | Status: SHIPPED | OUTPATIENT
Start: 2019-12-21

## 2019-12-21 RX ORDER — KETOROLAC TROMETHAMINE 30 MG/ML
60 INJECTION, SOLUTION INTRAMUSCULAR; INTRAVENOUS ONCE
Status: COMPLETED | OUTPATIENT
Start: 2019-12-21 | End: 2019-12-21

## 2019-12-21 RX ADMIN — KETOROLAC TROMETHAMINE 60 MG: 30 INJECTION, SOLUTION INTRAMUSCULAR; INTRAVENOUS at 14:45

## 2019-12-21 NOTE — PROGRESS NOTES
Chief Complaint:    Chief Complaint   Patient presents with   • Work-Related Injury     New WC for R elbow and R wrist injury/ DOI: 08/15/2019/ Mirta       History of Present Illness:    DOI: 8/15/19. She was putting heavy PTE onto molds which led to pain right elbow, forearm, wrist, and hand. Told work about this and work wrapped it and gave her Tylenol. Have used Advil and Ibuprofen without help. Pain is worse when using right hand, such as picking up coffee cup. She sometimes gets trigger finger right 4th finger.      Review of Systems:    Constitutional: Negative for fever, chills, and diaphoresis.   Eyes: Negative for change in vision, photophobia, pain, redness, and discharge.  ENT: Negative for ear pain, ear discharge, hearing loss, tinnitus, nasal congestion, nosebleeds, and sore throat.    Respiratory: Negative for cough, hemoptysis, sputum production, shortness of breath, wheezing, and stridor.    Cardiovascular: Negative for chest pain, palpitations, orthopnea, claudication, leg swelling, and PND.   Gastrointestinal: Negative for abdominal pain, nausea, vomiting, diarrhea, constipation, blood in stool, and melena.   Genitourinary: Negative for dysuria, urinary urgency, urinary frequency, hematuria, and flank pain.   Musculoskeletal: See HPI.   Skin: Negative for rash and itching.   Neurological: Negative for dizziness, tingling, tremors, sensory change, speech change, focal weakness, seizures, loss of consciousness, and headaches.   Endo: Negative for polydipsia.   Heme: Does not bruise/bleed easily.   Psychiatric/Behavioral: Negative for depression, suicidal ideas, hallucinations, memory loss and substance abuse. The patient is not nervous/anxious and does not have insomnia.        Past Medical History:    Past Medical History:   Diagnosis Date   • 599.0     W#/ Pg   • Rh Negative Status During Pregnancy requiring RHO-sami at 28 weeks 3/26/2010     Past Surgical History:    Past Surgical History:    Procedure Laterality Date   • REPEAT C SECTION  6/26/2010    Performed by MARCO A BAUER at [facility]   • PRIMARY C SECTION  2003     Social History:    Social History     Socioeconomic History   • Marital status: Single     Spouse name: Not on file   • Number of children: Not on file   • Years of education: Not on file   • Highest education level: Not on file   Occupational History   • Not on file   Social Needs   • Financial resource strain: Not on file   • Food insecurity:     Worry: Not on file     Inability: Not on file   • Transportation needs:     Medical: Not on file     Non-medical: Not on file   Tobacco Use   • Smoking status: Current Every Day Smoker     Packs/day: 0.50     Years: 4.00     Pack years: 2.00     Types: Cigarettes   • Smokeless tobacco: Never Used   • Tobacco comment: 3-4 PER DAY no desire to quit   Substance and Sexual Activity   • Alcohol use: No   • Drug use: No   • Sexual activity: Yes     Partners: Male     Birth control/protection: Other-See Comments     Comment: VCF   Lifestyle   • Physical activity:     Days per week: Not on file     Minutes per session: Not on file   • Stress: Not on file   Relationships   • Social connections:     Talks on phone: Not on file     Gets together: Not on file     Attends Temple service: Not on file     Active member of club or organization: Not on file     Attends meetings of clubs or organizations: Not on file     Relationship status: Not on file   • Intimate partner violence:     Fear of current or ex partner: Not on file     Emotionally abused: Not on file     Physically abused: Not on file     Forced sexual activity: Not on file   Other Topics Concern   • Not on file   Social History Narrative   • Not on file     Family History:    Family History   Problem Relation Age of Onset   • Cancer Maternal Aunt         BREAST   • Heart Disease Paternal Grandmother    • Diabetes Paternal Grandmother         insulin   • Cancer Maternal  "Grandfather         Throat     Medications:    Current Outpatient Medications on File Prior to Visit   Medication Sig Dispense Refill   • fluticasone (FLONASE) 50 MCG/ACT nasal spray Spray 1 Spray in nose 2 times a day. 1 Bottle 5     No current facility-administered medications on file prior to visit.      Allergies:    Allergies   Allergen Reactions   • Percocet [Oxycodone-Acetaminophen] Hives       Vitals:    Vitals:    12/21/19 1410   BP: 100/72   Pulse: 96   Resp: 16   Temp: 36.6 °C (97.8 °F)   TempSrc: Temporal   SpO2: 100%   Weight: 60.3 kg (133 lb)   Height: 1.651 m (5' 5\")       Physical Exam:    Constitutional: Vital signs reviewed. Appears well-developed and well-nourished. No acute distress.   Eyes: Sclera white, conjunctivae clear.  ENT: External ears normal. Hearing normal.  Cardiovascular: Peripheral pulses 2+.   Pulmonary/Chest: Respirations non-labored.  Musculoskeletal: Mildly decreased range of motion right elbow, forearm, wrist, and hand due to pain.  Neurological: Alert and oriented to person, place, and time. Muscle tone normal. Coordination normal. Light touch and sensation normal.   Skin: No rashes or lesions. Warm, dry, normal turgor.  Psychiatric: Normal mood and affect. Behavior is normal. Judgment and thought content normal.       Assessment / Plan:    1. Strain of right elbow and forearm, initial encounter  - ketorolac (TORADOL) injection 60 mg  - predniSONE (DELTASONE) 20 MG Tab; 2 TABS BY MOUTH ONCE A DAY ON DAYS 1-5, 1 TAB ONCE A DAY ON DAYS 6-10. TAKE WITH FOOD.  Dispense: 15 Tab; Refill: 0    2. Strain of right wrist, initial encounter  - ketorolac (TORADOL) injection 60 mg  - predniSONE (DELTASONE) 20 MG Tab; 2 TABS BY MOUTH ONCE A DAY ON DAYS 1-5, 1 TAB ONCE A DAY ON DAYS 6-10. TAKE WITH FOOD.  Dispense: 15 Tab; Refill: 0    3. Strain of right hand, initial encounter  - ketorolac (TORADOL) injection 60 mg  - predniSONE (DELTASONE) 20 MG Tab; 2 TABS BY MOUTH ONCE A DAY ON DAYS 1-5, " 1 TAB ONCE A DAY ON DAYS 6-10. TAKE WITH FOOD.  Dispense: 15 Tab; Refill: 0      Discussed with her DDX, management options, and risks, benefits, and alternatives to treatment plan agreed upon.    Work restrictions: No use of right hand and arm. Use right wrist brace as needed.    Right wrist brace provided to use as needed.    Due to duration and severity of symptoms and sub-optimal help with over-the-counter medications, offered potent anti-inflammatory treatment with Toradol (Ketorolac) IM and Prednisone course. She would like and these were given and prescribed.    Return on 12/27/19 or sooner if needed.

## 2019-12-21 NOTE — LETTER
"EMPLOYEE’S CLAIM FOR COMPENSATION/ REPORT OF INITIAL TREATMENT  FORM C-4    EMPLOYEE’S CLAIM - PROVIDE ALL INFORMATION REQUESTED   First Name  Jasmyne Last Name  Moose Birthdate                    1985                Sex  female Claim Number   Home Address  [address] Age  34 y.o. 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 (if applicable)  Putting PTE on moldes    How did this injury or occupational disease occur? (Be specific an answer in detail. Use additional sheet if necessary)  It happened around the 15th Putting heavy PTE on to moble for sound pruffing   If you believe that you have an occupational disease, when did you first have knowledge of the disability and it relationship to your employment?  NA Witnesses to the Accident  Rubirta      Nature of Injury or Occupational Disease  Workers' Compensation  Part(s) of Body Injured or Affected  Wrist (R) and Hand (R), Elbow (R),     I certify that the above is true and correct to the best of my knowledge and that I have provided this " information in order to obtain the benefits of [place]  I hereby authorize any physician, chiropractor, surgeon, practitioner, or other person, any hospital, including [facility] or [facility], any medical service organization, any insurance company, or other institution or organization to release to each other, any medical or other information, including benefits paid or payable, pertinent to this injury or disease, except information relative to diagnosis, treatment and/or counseling for AIDS, psychological conditions, alcohol or controlled substances, for which I must give specific authorization.  A Photostat of this authorization shall be as valid as the original.     Date   Place   Employee’s Signature   THIS REPORT MUST BE COMPLETED AND MAILED WITHIN 3 WORKING DAYS OF TREATMENT   Place  [facility]  Name of Facility  [facility]   Date  12/21/2019 Diagnosis  (S56.911A) Strain of right elbow and forearm, initial encounter  (S66.911A) Strain of right wrist, initial encounter  (S66.911A) Strain of right hand, initial encounter Is there evidence the injured employee was under the influence of alcohol and/or another controlled substance at the time of accident?   Hour  1:31 PM Description of Injury or Disease  Diagnoses of Strain of right elbow and forearm, initial encounter, Strain of right wrist, initial encounter, and Strain of right hand, initial encounter were pertinent to this visit. No   Treatment  Due to duration and severity of symptoms and sub-optimal help with over-the-counter medications, offered potent anti-inflammatory treatment with Toradol (Ketorolac) IM and Prednisone course. She would like and these were given and prescribed. Right wrist brace provided to use as needed.  Have you advised the patient to remain off work five days or more? No   X-Ray Findings       "If Yes   From Date  To Date      From information given by the employee, together with medical evidence, can you directly connect this injury or occupational disease as job incurred?  Yes If No Full Duty No Modified Duty  Yes   Is additional medical care by a physician indicated?  Yes  Comments:Return on 12/27/19 or sooner if needed. If Modified Duty, Specify any Limitations / Restrictions  No use of right hand and arm. Use right wrist brace as needed.     Do you know of any previous injury or disease contributing to this condition or occupational disease?                            No   Date  12/21/2019 Print Doctor’s Name Isaac Gamble M.D. I certify the employer’s copy of  this form was mailed on:   Address  [address] Insurer’s Use Only     [place]  [address]    Provider’s Tax ID Number  [id number] Telephone  Dept: [phone]        e-SignCHENISAAC M.D.   e-Signature: Dr. Ryan Mendez,   Medical Director Degree  MD        ORIGINAL-TREATING PHYSICIAN OR CHIROPRACTOR    PAGE 2-INSURER/TPA    PAGE 3-EMPLOYER    PAGE 4-EMPLOYEE             Form C-4 (rev.10/07)              BRIEF DESCRIPTION OF RIGHTS AND BENEFITS  (Pursuant to [place] 616C.[place])    Notice of Injury or Occupational Disease (Incident Report Form C-1): If an injury or occupational disease (OD) arises out of and in the course of employment, you must provide written notice to your employer as soon as practicable, but no later than 7 days after the accident or OD. Your employer shall maintain a sufficient supply of the required forms.    Claim for Compensation (Form C-4): If medical treatment is sought, the form C-4 is available at the place of initial treatment. A completed \"Claim for Compensation\" (Form C-4) must be filed within 90 days after an accident or OD. The treating physician or chiropractor must, within 3 working days after treatment, complete and mail to the employer, the employer's insurer and " third-party , the Claim for Compensation.    Medical Treatment: If you require medical treatment for your on-the-job injury or OD, you may be required to select a physician or chiropractor from a list provided by your workers’ compensation insurer, if it has contracted with an Organization for Managed Care (MCO) or Preferred Provider Organization (PPO) or providers of health care. If your employer has not entered into a contract with an MCO or PPO, you may select a physician or chiropractor from the Panel of Physicians and Chiropractors. Any medical costs related to your industrial injury or OD will be paid by your insurer.    Temporary Total Disability (TTD): If your doctor has certified that you are unable to work for a period of at least 5 consecutive days, or 5 cumulative days in a 20-day period, or places restrictions on you that your employer does not accommodate, you may be entitled to TTD compensation.    Temporary Partial Disability (TPD): If the wage you receive upon reemployment is less than the compensation for TTD to which you are entitled, the insurer may be required to pay you TPD compensation to make up the difference. TPD can only be paid for a maximum of 24 months.    Permanent Partial Disability (PPD): When your medical condition is stable and there is an indication of a PPD as a result of your injury or OD, within 30 days, your insurer must arrange for an evaluation by a rating physician or chiropractor to determine the degree of your PPD. The amount of your PPD award depends on the date of injury, the results of the PPD evaluation and your age and wage.    Permanent Total Disability (PTD): If you are medically certified by a treating physician or chiropractor as permanently and totally disabled and have been granted a PTD status by your insurer, you are entitled to receive monthly benefits not to exceed 66 2/3% of your average monthly wage. The amount of your PTD payments is subject  to reduction if you previously received a PPD award.    Vocational Rehabilitation Services: You may be eligible for vocational rehabilitation services if you are unable to return to the job due to a permanent physical impairment or permanent restrictions as a result of your injury or occupational disease.    Transportation and Per Eduard Reimbursement: You may be eligible for travel expenses and per eduard associated with medical treatment.    Reopening: You may be able to reopen your claim if your condition worsens after claim closure.    Appeal Process: If you disagree with a written determination issued by the insurer or the insurer does not respond to your request, you may appeal to the Department of Administration, , by following the instructions contained in your determination letter. You must appeal the determination within 70 days from the date of the determination letter at [address], [address], [place], [place] [address], or [address], [address], [place] [address]. If you disagree with the  decision, you may appeal to the Department of Administration, . You must file your appeal within 30 days from the date of the  decision letter at [address], [address], [place], [place] [address], or [address], [address], [place], [place] [address]. If you disagree with a decision of an , you may file a petition for judicial review with the District Court. You must do so within 30 days of the Appeal Officer’s decision. You may be represented by an  at your own expense or you may contact the [facility] for possible representation.    [place]  for Injured Workers (NAIW): If you disagree with a  decision, you may request that NAIW represent you without charge at an  Hearing. For information regarding denial of benefits, you may contact the [facility] at: [address], [address], [place] [address], [phone], or [address], [address], [place] [address], [phone]    To File a Complaint with the Division: If you wish to file a complaint with the  of the Division of Industrial Relations (DIR),  please contact the Workers’ Compensation Section, [address], [address], [place], [place] [address], telephone [phone], or [address], [address], [place], [place] [address], telephone [phone].    For assistance with Workers’ Compensation Issues: You may contact the Office of the Governor Consumer Health Assistance, [address] [place] [address], [address], [place], [place] [address], Toll Free 1-683.151.7781, Web site: [email or web address]., E-mail [email or web address]                   __________________________________________________________________                                                     _________        Employee Name / Signature                                                                                                                                              Date                                                                                                                                                                                                     D-2 (rev. 06/18)

## 2019-12-21 NOTE — LETTER
"EMPLOYEE’S CLAIM FOR COMPENSATION/ REPORT OF INITIAL TREATMENT  FORM C-4    EMPLOYEE’S CLAIM - PROVIDE ALL INFORMATION REQUESTED   First Name  Jasmyne Last Name  Moose Birthdate                    1985                Sex  female Claim Number   Home Address  [address] Age  34 y.o. 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 (if applicable)  Putting PTE on moldes    How did this injury or occupational disease occur? (Be specific an answer in detail. Use additional sheet if necessary)  It happened around the 15th Putting heavy PTE on to moble for sound pruffing   If you believe that you have an occupational disease, when did you first have knowledge of the disability and it relationship to your employment?  NA Witnesses to the Accident  Rubirta      Nature of Injury or Occupational Disease  Workers' Compensation  Part(s) of Body Injured or Affected  Wrist (R) and Hand (R), Elbow (R),     I certify that the above is true and correct to the best of my knowledge and that I have provided this information in order to obtain the " benefits of [place] of [place]).  I hereby authorize any physician, chiropractor, surgeon, practitioner, or other person, any hospital, including [facility] or [facility], any medical service organization, any insurance company, or other institution or organization to release to each other, any medical or other information, including benefits paid or payable, pertinent to this injury or disease, except information relative to diagnosis, treatment and/or counseling for AIDS, psychological conditions, alcohol or controlled substances, for which I must give specific authorization.  A Photostat of this authorization shall be as valid as the original.     Date: 12/21/2019   Place: [facility]   Employee’s Signature   THIS REPORT MUST BE COMPLETED AND MAILED WITHIN 3 WORKING DAYS OF TREATMENT   Place  [facility]  Name of Facility  [place]   Date  12/21/2019 Diagnosis  (S56.911A) Strain of right elbow and forearm, initial encounter  (S66.911A) Strain of right wrist, initial encounter  (S66.911A) Strain of right hand, initial encounter Is there evidence the injured employee was under the influence of alcohol and/or another controlled substance at the time of accident?   Hour  1:31 PM Description of Injury or Disease  Diagnoses of Strain of right elbow and forearm, initial encounter, Strain of right wrist, initial encounter, and Strain of right hand, initial encounter were pertinent to this visit. No   Treatment  Due to duration and severity of symptoms and sub-optimal help with over-the-counter medications, offered potent anti-inflammatory treatment with Toradol (Ketorolac) IM and Prednisone course. She would like and these were given and prescribed. Right wrist brace provided to use as needed.  Have you advised the patient to remain off work five days or more? No   X-Ray Findings      If  "Yes   From Date  To Date      From information given by the employee, together with medical evidence, can you directly connect this injury or occupational disease as job incurred?  Yes If No Full Duty No Modified Duty  Yes   Is additional medical care by a physician indicated?  Yes  Comments:Return on 12/27/19 or sooner if needed. If Modified Duty, Specify any Limitations / Restrictions  No use of right hand and arm. Use right wrist brace as needed.     Do you know of any previous injury or disease contributing to this condition or occupational disease?                            No   Date  12/21/2019 Print Doctor’s Name Isaac Gamble M.D. I certify the employer’s copy of  this form was mailed on:   Address  [address] Insurer’s Use Only     [place]  [address]    Provider’s Tax ID Number  [id number] Telephone  Dept: [phone]        e-SignCHEN, ISAAC BUTLER M.D.   e-Signature: Dr. Ryan Mendez,   Medical Director Degree  MD        ORIGINAL-TREATING PHYSICIAN OR CHIROPRACTOR    PAGE 2-INSURER/TPA    PAGE 3-EMPLOYER    PAGE 4-EMPLOYEE             Form C-4 (rev.10/07)              BRIEF DESCRIPTION OF RIGHTS AND BENEFITS  (Pursuant to [place]RS 616C.050)    Notice of Injury or Occupational Disease (Incident Report Form C-1): If an injury or occupational disease (OD) arises out of and in the course of employment, you must provide written notice to your employer as soon as practicable, but no later than 7 days after the accident or OD. Your employer shall maintain a sufficient supply of the required forms.    Claim for Compensation (Form C-4): If medical treatment is sought, the form C-4 is available at the place of initial treatment. A completed \"Claim for Compensation\" (Form C-4) must be filed within 90 days after an accident or OD. The treating physician or chiropractor must, within 3 working days after treatment, complete and mail to the employer, the employer's insurer and third-party " , the Claim for Compensation.    Medical Treatment: If you require medical treatment for your on-the-job injury or OD, you may be required to select a physician or chiropractor from a list provided by your workers’ compensation insurer, if it has contracted with an Organization for Managed Care (MCO) or Preferred Provider Organization (PPO) or providers of health care. If your employer has not entered into a contract with an MCO or PPO, you may select a physician or chiropractor from the Panel of Physicians and Chiropractors. Any medical costs related to your industrial injury or OD will be paid by your insurer.    Temporary Total Disability (TTD): If your doctor has certified that you are unable to work for a period of at least 5 consecutive days, or 5 cumulative days in a 20-day period, or places restrictions on you that your employer does not accommodate, you may be entitled to TTD compensation.    Temporary Partial Disability (TPD): If the wage you receive upon reemployment is less than the compensation for TTD to which you are entitled, the insurer may be required to pay you TPD compensation to make up the difference. TPD can only be paid for a maximum of 24 months.    Permanent Partial Disability (PPD): When your medical condition is stable and there is an indication of a PPD as a result of your injury or OD, within 30 days, your insurer must arrange for an evaluation by a rating physician or chiropractor to determine the degree of your PPD. The amount of your PPD award depends on the date of injury, the results of the PPD evaluation and your age and wage.    Permanent Total Disability (PTD): If you are medically certified by a treating physician or chiropractor as permanently and totally disabled and have been granted a PTD status by your insurer, you are entitled to receive monthly benefits not to exceed 66 2/3% of your average monthly wage. The amount of your PTD payments is subject to  reduction if you previously received a PPD award.    Vocational Rehabilitation Services: You may be eligible for vocational rehabilitation services if you are unable to return to the job due to a permanent physical impairment or permanent restrictions as a result of your injury or occupational disease.    Transportation and Per Eduard Reimbursement: You may be eligible for travel expenses and per eduard associated with medical treatment.    Reopening: You may be able to reopen your claim if your condition worsens after claim closure.    Appeal Process: If you disagree with a written determination issued by the insurer or the insurer does not respond to your request, you may appeal to the Department of Administration, , by following the instructions contained in your determination letter. You must appeal the determination within 70 days from the date of the determination letter at [address], [address], [place], [place] [address], or [address], [address], [place] [address]. If you disagree with the  decision, you may appeal to the Department of Administration, . You must file your appeal within 30 days from the date of the  decision letter at [address], [address], [place], [place] [address], or [address], [address], [place] [address]. If you disagree with a decision of an , you may file a petition for judicial review with the District Court. You must do so within 30 days of the Appeal Officer’s decision. You may be represented by an  at your own expense or you may contact the [facility] for possible representation.    [place]  for Injured Workers (NAIW): If you disagree with a  decision, you may request that NAIW represent you without charge at an  Hearing. For information regarding denial of benefits, you may contact the [facility] at: [address], [address], [place] [address], [phone], or [address], [address], [place] [address], [phone]    To File a Complaint with the Division: If you wish to file a complaint with the  of the Division of Industrial Relations (DIR),  please contact the Workers’ Compensation Section, [address], [address], [place], [place] [address], telephone (464) [phone], or [address], [address], [place], [place] [address], telephone [phone].    For assistance with Workers’ Compensation Issues: You may contact the Office of the Governor Consumer Health Assistance, [address] [place] [address], [address], [place], [place] [address], Toll Free 1-267.275.8451, Web site: http://[employer].nv., E-mail [email or web address].nv.                                                                                12/21/2019        __________________________________________________________________                                                     _________        Employee Name / Signature                                                                                                                                              Date                                                                                                                                                                                                     D-2 (rev. 06/18)

## 2019-12-27 ENCOUNTER — OCCUPATIONAL MEDICINE (OUTPATIENT)
Dept: URGENT CARE | Facility: PHYSICIAN GROUP | Age: 34
End: 2019-12-27
Payer: MEDICAID

## 2019-12-27 VITALS
HEART RATE: 85 BPM | DIASTOLIC BLOOD PRESSURE: 70 MMHG | RESPIRATION RATE: 16 BRPM | SYSTOLIC BLOOD PRESSURE: 110 MMHG | OXYGEN SATURATION: 98 % | TEMPERATURE: 97.5 F

## 2019-12-27 DIAGNOSIS — S56.911D STRAIN OF RIGHT ELBOW AND FOREARM, SUBSEQUENT ENCOUNTER: ICD-10-CM

## 2019-12-27 PROCEDURE — 99214 OFFICE O/P EST MOD 30 MIN: CPT | Performed by: PHYSICIAN ASSISTANT

## 2019-12-27 NOTE — LETTER
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    Medical Information   Related to Industrial Injury? Yes    Subjective Complaints:  DOI: 8/15/19, 2nd visit   She was putting heavy PTE onto molds causing pain to the right elbow, forearm, wrist, and hand. She sometimes gets trigger finger right 4th finger. OTC meds taking: none because it wasn't working. Initial visit 12/21, was put on steroids and given a wrist guard. Pain has not changed from previous. Stopped working at [employer] in September. Worked at [employer] for a short time. Has not been working since 12/6/19. Right hand dominant.   Objective Findings: Extremities: Tenderness noted on the lateral epicondyle of the right elbow, worse with resisted supination and resisted pronation.   strength is slightly less in the right hand due to wrist pain.  FROM right wrist pain with range of motion.  No soft tissue swelling or skin changes are noted.   Pre-Existing Condition(s):     Assessment:   Condition Same    Status: Additional Care Required  Permanent Disability:No    Plan: PT    Diagnostics:      Comments:  Assessment/Plan:  Suspect tendinitis.  Recommend an elbow tendinitis band as needed for comfort.  Referring patient to physical therapy given the persistent symptoms.  Discussed appropriate dosing of ibuprofen/acetaminophen as needed for pain.  May u  se ice, heat, and over-the-counter muscle rubs as needed for additional  pain relief.  Refer to D 39 for restrictions.  Return to clinic in 1 month for reevaluation, sooner for any significant changes in symptoms.  1. Strain of right elbow and forearm  , subsequent encounter          Disability Information   Status: Released to Restricted Duty    From:  12/27/2019  Through:   Restrictions are: Temporary   Physical Restrictions   Sitting:    Standing:    Stooping:    Bending:      Squatting:    Walking:    Climbing:    Pushing:      Pulling:    Other:    Reaching Above Shoulder (L):   Reaching Above Shoulder (R):       Reaching Below Shoulder (L):    Reaching Below Shoulder (R):      Not to exceed Weight Limits   Carrying(hrs):   Weight Limit(lb): < or = to 10 pounds Lifting(hrs):   Weight  Limit(lb): < or = to 10 pounds   Comments:      Repetitive Actions   Hands: i.e. Fine Manipulations from Grasping:     Feet: i.e. Operating Foot Controls:     Driving / Operate Machinery:     Physician Name: Jayy Camejo P.A.-C. Physician Signature: JAYY Manzano P.A.-C. e-Signature: Dr. Ryan Mendez, Medical Director   Clinic Name / Location: [address] [address] Clinic Phone Number: Dept: [phone]   Appointment Time: 12:00 Pm Visit Start Time: 12:48 PM   Check-In Time:  11:57 Am Visit Discharge Time:  01:05 PM   Original-Treating Physician or Chiropractor    Page 2-Insurer/TPA    Page 3-Employer    Page 4-Employee

## 2019-12-27 NOTE — PROGRESS NOTES
Chief Complaint   Patient presents with   • Follow-Up      fv-feeling overall the same        HISTORY OF PRESENT ILLNESS: Patient is a 34 y.o. female who presents today for the following:    DOI: 8/15/19, 2nd visit   She was putting heavy PTE onto molds causing pain to the right elbow, forearm, wrist, and hand. She sometimes gets trigger finger right 4th finger. OTC meds taking: none because it wasn't working. Initial visit 12/21, was put on steroids and given a wrist guard. Pain has not changed from previous. Stopped working at [employer] in September. Worked at [employer] for a short time. Has not been working since 12/6/19. Right hand dominant.    Allergies:Percocet [oxycodone-acetaminophen]    PMH: No pertinent past medical history to this problem  MEDS: Medications were reviewed in Epic  ALLERGIES: Allergies were reviewed in Epic  SOCHX: Works at [employer]; stopped working here in September.  FH: No pertinent family history to this problem    Review of Systems:   Constitutional ROS: No unexpected change in weight, No weakness, No fatigue  Musculoskeletal/Extremities ROS: + right wrist/elbow pain.     Exam:  /70   Pulse 85   Temp 36.4 °C (97.5 °F)   Resp 16   SpO2 98%   General: Well developed, well nourished. No distress.  Pulmonary: Unlabored respiratory effort.   Neurologic: Grossly nonfocal. No facial asymmetry noted.  Extremities: Tenderness noted on the lateral epicondyle of the right elbow, worse with resisted supination and resisted pronation.   strength is slightly less in the right hand due to wrist pain.  FROM right wrist pain with range of motion.  No soft tissue swelling or skin changes are noted.  Skin: Warm, dry, good turgor. No rashes in visible areas.   Psych: Normal mood. Alert and oriented x3. Judgment and insight is normal.    Assessment/Plan:  Suspect tendinitis.  Recommend an elbow tendinitis band as needed for comfort.  Referring patient to physical therapy given the  persistent symptoms.  Discussed appropriate dosing of ibuprofen/acetaminophen as needed for pain.  May use ice, heat, and over-the-counter muscle rubs as needed for additional pain relief.  Refer to D 39 for restrictions.  Return to clinic in 1 month for reevaluation, sooner for any significant changes in symptoms.  1. Strain of right elbow and forearm, subsequent encounter

## 2021-06-10 NOTE — LETTER
[facility] [place] [place] [address]  Phone:  [phone] - Fax:  [phone]   [facility] Progress Report and Disability Certification  Date of Service: 12/21/2019   No Show:  No  Date / Time of Next Visit: 12/27/2019   Claim Information   Patient Name: Jasmyne Virk  Claim Number:     Employer: [employer]  Date of Injury: 8/15/2019     Insurer / TPA: [facility]  ID / SSN:     Occupation: [employer]  Diagnosis: Diagnoses of Strain of right elbow and forearm, initial encounter, Strain of right wrist, initial encounter, and Strain of right hand, initial encounter were pertinent to this visit.    Medical Information   Related to Industrial Injury? Yes    Subjective Complaints:  DOI: 8/15/19. She was putting heavy PTE onto molds which led to pain right elbow, forearm, wrist, and hand. Told work about this and work wrapped it and gave her Tylenol. Have used Advil and Ibuprofen without help. Pain is worse when using right hand, such as picking up coffee cup. She sometimes gets trigger finger right 4th finger.   Objective Findings: Mildly decreased range of motion right elbow, forearm, wrist, and hand due to pain.   Pre-Existing Condition(s): None.   Assessment:   Initial Visit    Status: Additional Care Required  Comments:Return on 12/27/19 or sooner if needed.  Permanent Disability:No    Plan: Medication  Comments:See below.    Diagnostics:      Comments:  Due to duration and severity of symptoms and sub-optimal help with over-the-counter medications, offered potent anti-inflammatory treatment with Toradol (Ketorolac) IM and Prednisone course. She would like and these were given and prescribed. Right w  rist brace provided to use as needed.    Disability Information   Status: Released to Restricted Duty    From:  12/21/2019  Through: 12/27/2019 Restrictions are: Temporary   Physical Restrictions   Sitting:    Standing:   Stooping:    Bending:      Squatting:    Walking:    Climbing:    Pushing:      Pulling:    Other:    Reaching Above Shoulder (L):   Reaching Above Shoulder (R):       Reaching Below Shoulder (L):    Reaching Below Shoulder (R):      Not to exceed Weight Limits   Carrying(hrs):   Weight Limit(lb):   Lifting(hrs):   Weight  Limit(lb):     Comments: No use of right hand and arm. Use right wrist brace as needed.    Repetitive Actions   Hands: i.e. Fine Manipulations from Grasping:     Feet: i.e. Operating Foot Controls:     Driving / Operate Machinery:     Physician Name: Isaac Gamble M.D. Physician Signature: ISAAC Garcia M.D. e-Signature: Dr. Ryan Mendez, Medical Director   Clinic Name / Location: [address] [address] Clinic Phone Number: Dept: [phone]   Appointment Time: 12:30 Pm Visit Start Time: 1:31 PM   Check-In Time:  12:29 Pm Visit Discharge Time:  2:55 PM   Original-Treating Physician or Chiropractor    Page 2-Insurer/TPA    Page 3-Employer    Page 4-Employee     Quality 110: Preventive Care And Screening: Influenza Immunization: Influenza Immunization previously received during influenza season